# Patient Record
Sex: MALE | Race: WHITE | NOT HISPANIC OR LATINO | Employment: FULL TIME | URBAN - METROPOLITAN AREA
[De-identification: names, ages, dates, MRNs, and addresses within clinical notes are randomized per-mention and may not be internally consistent; named-entity substitution may affect disease eponyms.]

---

## 2017-08-01 ENCOUNTER — OFFICE VISIT (OUTPATIENT)
Dept: INTERNAL MEDICINE | Age: 22
End: 2017-08-01

## 2017-08-01 VITALS
HEART RATE: 72 BPM | WEIGHT: 145 LBS | DIASTOLIC BLOOD PRESSURE: 66 MMHG | HEIGHT: 68 IN | BODY MASS INDEX: 21.98 KG/M2 | SYSTOLIC BLOOD PRESSURE: 114 MMHG | TEMPERATURE: 98 F | OXYGEN SATURATION: 99 %

## 2017-08-01 DIAGNOSIS — Z23 NEED FOR DIPHTHERIA-TETANUS-PERTUSSIS (TDAP) VACCINE: ICD-10-CM

## 2017-08-01 DIAGNOSIS — R07.89 ATYPICAL CHEST PAIN: ICD-10-CM

## 2017-08-01 DIAGNOSIS — G44.209 TENSION HEADACHE: Primary | ICD-10-CM

## 2017-08-01 PROCEDURE — 99203 OFFICE O/P NEW LOW 30 MIN: CPT | Performed by: INTERNAL MEDICINE

## 2017-08-01 PROCEDURE — 90715 TDAP VACCINE 7 YRS/> IM: CPT | Performed by: INTERNAL MEDICINE

## 2017-08-01 PROCEDURE — 90471 IMMUNIZATION ADMIN: CPT | Performed by: INTERNAL MEDICINE

## 2017-08-01 NOTE — PROGRESS NOTES
"Donato Sandhu / 22 y.o. / male  08/01/2017    VITALS:    /66  Pulse 72  Temp 98 °F (36.7 °C)  Ht 68\" (172.7 cm)  Wt 145 lb (65.8 kg)  SpO2 99%  BMI 22.05 kg/m2  BP Readings from Last 3 Encounters:   08/01/17 114/66     Wt Readings from Last 3 Encounters:   08/01/17 145 lb (65.8 kg)      Body mass index is 22.05 kg/(m^2).    CC: Main reason(s) for today's visit: Establish Care      HPI:    Patient is a 22 y.o. male who is here to establish care.     2 weeks ago was experiencing regular headaches located on the occipital region.  It felt like a throbbing ache without associated photophobia, phonophobia, nausea or change in vision.  Denies previous history of headaches.  He attributes headaches to tension and stress from his previous job at [employer].  He now works at a golf shop and states that his headaches have gone away.  He also experienced some intermittent sharp left anterior chest pain.  He denies exertional chest pain or shortness of breath.  Denies palpitations.  Denies childhood history of any heart murmur.  He did have childhood history of asthma Osgood-Schlatter's dz. Denies tobacco, regular etoh or drug use.       Patient Care Team:  Fito Escobar MD as PCP - General (Internal Medicine)  ____________________________________________________________________    ASSESSMENT & PLAN:    1. Tension headache    2. Atypical chest pain    3. Need for diphtheria-tetanus-pertussis (Tdap) vaccine      Orders Placed This Encounter   Procedures   • Tdap Vaccine Greater Than or Equal To 6yo IM        Summary/Discussion:     · Tension HA's have resolved w/ decreased stress.   · Call/RTO if worsening/recurrent sx's.   · Reassurance w/ intermittent chest pain that has resolved. Call/RTO if needed.      No Follow-up on file.    No future appointments.    ____________________________________________________________________    REVIEW OF SYSTEMS    Review of Systems   Constitutional: Negative.    HENT: Negative.    Eyes: " Negative.    Respiratory: Negative.    Cardiovascular: Negative.    Gastrointestinal: Negative.    Endocrine: Negative.    Genitourinary: Negative.    Musculoskeletal: Negative.    Skin: Negative.    Allergic/Immunologic: Negative.    Neurological: Negative.    Hematological: Negative.    Psychiatric/Behavioral: Negative.      Other: As noted per HPI      PHYSICAL EXAMINATION    Physical Exam   Constitutional: He appears well-nourished. No distress.   HENT:   Head: Normocephalic.   Right Ear: Tympanic membrane normal.   Left Ear: Tympanic membrane normal.   Mouth/Throat: Oropharynx is clear and moist. No oral lesions. Tonsils are 3+ on the right. Tonsils are 3+ on the left. No tonsillar exudate.   Eyes: Conjunctivae and EOM are normal. Pupils are equal, round, and reactive to light. No scleral icterus.   Neck: Neck supple. No tracheal deviation present. No thyromegaly present.   Cardiovascular: Normal rate, regular rhythm, normal heart sounds and intact distal pulses.  Exam reveals no gallop and no friction rub.    No murmur heard.  Pulmonary/Chest: Effort normal and breath sounds normal.   Abdominal: Soft. Bowel sounds are normal. He exhibits no distension and no mass. There is no tenderness. No hernia.   Musculoskeletal: He exhibits no edema or deformity.   Lymphadenopathy:     He has no cervical adenopathy.        Right: No supraclavicular adenopathy present.        Left: No supraclavicular adenopathy present.   Neurological: He is alert. He has normal reflexes.   Skin: Skin is intact. No rash noted. No cyanosis or erythema. No pallor. Nails show no clubbing.   Psychiatric: He has a normal mood and affect. His behavior is normal. Judgment and thought content normal. Cognition and memory are normal.       REVIEWED DATA:    Labs:   No results found for: NA, K, AST, ALT, BUN, CREATININE, EGFRIFNONA, EGFRIFAFRI    No results found for: GLU, HGBA1C, MICROALBUR    No results found for: LDL, HDL, TRIG,  CHOLHDLRATIO    No results found for: TSH, FREET4     No results found for: WBC, HGB, PLT      Imaging:        Medical Tests:        Summary of old records / correspondence / consultant report:        Request outside records:        ______________________________________________________________________    No Known Allergies    No current outpatient prescriptions on file prior to visit.     No current facility-administered medications on file prior to visit.        PFSH:     The following portions of the patient's history were reviewed and updated as appropriate: Allergies / Current Medications / Past Medical History / Surgical History / Social History / Family History    Patient Active Problem List   Diagnosis   • Tension headache       Past Medical History:   Diagnosis Date   • Childhood asthma    • Osgood-Schlatter's disease     affected bilateral knees       Past Surgical History:   Procedure Laterality Date   • NO PAST SURGERIES         Social History     Social History   • Marital status: Single     Spouse name: N/A   • Number of children: N/A   • Years of education: N/A     Occupational History   • [employer]      Social History Main Topics   • Smoking status: Never Smoker   • Smokeless tobacco: Never Used   • Alcohol use Yes      Comment: occasional Etoh   • Drug use: No   • Sexual activity: Yes     Partners: Female     Birth control/ protection: OCP     Other Topics Concern   • None     Social History Narrative   • None       Family History   Problem Relation Age of Onset   • Anxiety disorder Mother    • Hypertension Mother    • No Known Problems Father    • Drug abuse Brother          **Sumeet Disclaimer:   Much of this encounter note is an electronic transcription/translation of spoken language to printed text. The electronic translation of spoken language may permit erroneous, or at times, nonsensical words or phrases to be inadvertently transcribed. Although I have reviewed the note for such errors,  some may still exist.

## 2018-04-18 ENCOUNTER — TELEPHONE (OUTPATIENT)
Dept: INTERNAL MEDICINE | Age: 23
End: 2018-04-18

## 2018-04-18 ENCOUNTER — APPOINTMENT (OUTPATIENT)
Dept: GENERAL RADIOLOGY | Facility: HOSPITAL | Age: 23
End: 2018-04-18

## 2018-04-18 ENCOUNTER — HOSPITAL ENCOUNTER (EMERGENCY)
Facility: HOSPITAL | Age: 23
Discharge: HOME | End: 2018-04-18
Attending: EMERGENCY MEDICINE | Admitting: EMERGENCY MEDICINE

## 2018-04-18 VITALS
SYSTOLIC BLOOD PRESSURE: 132 MMHG | OXYGEN SATURATION: 97 % | HEART RATE: 79 BPM | WEIGHT: 145 LBS | BODY MASS INDEX: 21.98 KG/M2 | DIASTOLIC BLOOD PRESSURE: 87 MMHG | RESPIRATION RATE: 16 BRPM | TEMPERATURE: 99 F | HEIGHT: 68 IN

## 2018-04-18 DIAGNOSIS — R07.89 ATYPICAL CHEST PAIN: Primary | ICD-10-CM

## 2018-04-18 PROCEDURE — 71046 X-RAY EXAM CHEST 2 VIEWS: CPT

## 2018-04-18 PROCEDURE — 99283 EMERGENCY DEPT VISIT LOW MDM: CPT

## 2018-04-18 PROCEDURE — 93010 ELECTROCARDIOGRAM REPORT: CPT | Performed by: INTERNAL MEDICINE

## 2018-04-18 PROCEDURE — 93005 ELECTROCARDIOGRAM TRACING: CPT | Performed by: EMERGENCY MEDICINE

## 2018-04-18 RX ORDER — ALUMINA, MAGNESIA, AND SIMETHICONE 2400; 2400; 240 MG/30ML; MG/30ML; MG/30ML
15 SUSPENSION ORAL ONCE
Status: COMPLETED | OUTPATIENT
Start: 2018-04-18 | End: 2018-04-18

## 2018-04-18 RX ORDER — OMEPRAZOLE 20 MG/1
20 CAPSULE, DELAYED RELEASE ORAL DAILY
Qty: 30 CAPSULE | Refills: 0 | Status: SHIPPED | OUTPATIENT
Start: 2018-04-18 | End: 2018-05-15 | Stop reason: SDUPTHER

## 2018-04-18 RX ADMIN — LIDOCAINE HYDROCHLORIDE 15 ML: 20 SOLUTION ORAL; TOPICAL at 17:55

## 2018-04-18 RX ADMIN — ALUMINUM HYDROXIDE, MAGNESIUM HYDROXIDE, AND DIMETHICONE 15 ML: 400; 400; 40 SUSPENSION ORAL at 17:55

## 2018-04-18 NOTE — TELEPHONE ENCOUNTER
BABAK...  PRETTY after he was Pt was scheduled for a 340 pm apt with Fidelina on Friday for chest pain. He SW  [facility]orena office staff and stated to her that he was having some intermittent chest pain. After I spoke with him further he has had up to 4 episodes  and they keep getting longer including while he is driving stating having clinching chest pain. I let him know the apt  that was made for Friday at 340 was not soon enough, he needed to go to the ER to be evaluated since this could be something more severe and he agreed to go I let him know to schedule a F/U from the ER for this. RIVER

## 2018-04-18 NOTE — ED PROVIDER NOTES
[facility] EMERGENCY DEPARTMENT ENCOUNTER    CHIEF COMPLAINT  Chief Complaint: CP  History given by: patient  History limited by: nothing  [facility] Room Number: 47/47  PMD: Fito Escobar MD      HPI:  Pt is a 23 y.o. male who presents complaining of CP that began yesterday. Pt states that he woke up on yesterday morning and he had two sharp left-sided pains in his chest. He then had some dull pain later that night. Pt also had sharp pains this morning and a second episode this afternoon that shot down his chest. He states that the episodes last only a few seconds and typically resolve after a few deep breaths. The sharp pains are worse in the morning. Pt denies SOA, BLE swelling, palpitations, nausea, vomiting, and diaphoresis. Pt denies any recent travel. At presentation, Pt has only mild dull chest pain.     Onset: sudden  Duration: 5 seconds  Severity: moderate  Associated symptoms: none  Previous treatment: none    PAST MEDICAL HISTORY  Active Ambulatory Problems     Diagnosis Date Noted   • Tension headache 08/01/2017     Resolved Ambulatory Problems     Diagnosis Date Noted   • No Resolved Ambulatory Problems     Past Medical History:   Diagnosis Date   • Childhood asthma    • Osgood-Schlatter's disease        PAST SURGICAL HISTORY  Past Surgical History:   Procedure Laterality Date   • NO PAST SURGERIES         FAMILY HISTORY  Family History   Problem Relation Age of Onset   • Anxiety disorder Mother    • Hypertension Mother    • No Known Problems Father    • Drug abuse Brother        SOCIAL HISTORY  Social History     Social History   • Marital status: Single     Spouse name: N/A   • Number of children: N/A   • Years of education: N/A     Occupational History   • [employer]      Social History Main Topics   • Smoking status: Never Smoker   • Smokeless tobacco: Never Used   • Alcohol use Yes      Comment: occasional Etoh   • Drug use: No   • Sexual activity: Yes     Partners: Female     Birth control/ protection: OCP      Other Topics Concern   • Not on file     Social History Narrative   • No narrative on file       ALLERGIES  Review of patient's allergies indicates no known allergies.    REVIEW OF SYSTEMS  Review of Systems   Constitutional: Negative for activity change, appetite change, diaphoresis and fever.   HENT: Negative for congestion and sore throat.    Eyes: Negative.    Respiratory: Negative for cough and shortness of breath.    Cardiovascular: Positive for chest pain. Negative for palpitations and leg swelling.   Gastrointestinal: Negative for abdominal pain, diarrhea, nausea and vomiting.   Endocrine: Negative.    Genitourinary: Negative for decreased urine volume and dysuria.   Musculoskeletal: Negative for neck pain.   Skin: Negative for rash and wound.   Allergic/Immunologic: Negative.    Neurological: Negative for weakness, numbness and headaches.   Hematological: Negative.    Psychiatric/Behavioral: Negative.    All other systems reviewed and are negative.      PHYSICAL EXAM  ED Triage Vitals   Temp Heart Rate Resp BP SpO2   04/18/18 1600 04/18/18 1600 04/18/18 1600 04/18/18 1611 04/18/18 1600   99.2 °F (37.3 °C) 102 16 148/95 97 %      Temp src Heart Rate Source Patient Position BP Location FiO2 (%)   -- -- -- -- --              Physical Exam   Constitutional: He is oriented to person, place, and time and well-developed, well-nourished, and in no distress.   HENT:   Head: Normocephalic and atraumatic.   Mouth/Throat: Posterior oropharyngeal erythema (mild) present.   Eyes: EOM are normal. Pupils are equal, round, and reactive to light.   Neck: Normal range of motion. Neck supple.   Cardiovascular: Normal rate, regular rhythm and normal heart sounds.    Pulmonary/Chest: Effort normal and breath sounds normal. No respiratory distress. He exhibits no tenderness.   Abdominal: Soft. Bowel sounds are normal. There is no tenderness. There is no rebound and no guarding.   Musculoskeletal: Normal range of motion. He  exhibits no edema.   Lymphadenopathy:     He has no cervical adenopathy.   Neurological: He is alert and oriented to person, place, and time. He has normal sensation and normal strength.   Skin: Skin is warm and dry.   Psychiatric: Mood and affect normal.   Nursing note and vitals reviewed.      RADIOLOGY  XR Chest 2 View   Preliminary Result   No acute process.               I ordered the above noted radiological studies. Interpreted by radiologist. Reviewed by me in PACS.       PROCEDURES  Procedures  EKG           EKG time: 17:57  Rhythm/Rate: NSR 83  P waves and NH: Normal  QRS, axis: Normal   ST and T waves: Normal     Interpreted Contemporaneously by me, independently viewed  No Previous      PROGRESS AND CONSULTS  ED Course     1644  Ordered CXR for further evaluation.    1740  Ordered EKG for further evaluation. Ordered maalox-max to treat GI as possible source of pain.    1803  Rechecked Pt who is resting comfortably. Informed him that his EKG and CXR are unremarkable. Pt states that he is feeling better after receiving the GI cocktail. He agrees that it may have been referred pain from gastric air. Discussed plans for discharge and Pt understands and agrees to all plans.       MEDICAL DECISION MAKING  Results were reviewed/discussed with the patient and they were also made aware of online access. Pt also made aware that some labs, such as cultures, will not be resulted during ER visit and follow up with PMD is necessary.     MDM  Number of Diagnoses or Management Options     Amount and/or Complexity of Data Reviewed  Tests in the radiology section of CPT®: ordered and reviewed (CXR shows nothing acute.)  Tests in the medicine section of CPT®: ordered and reviewed (See EKG note.)           DIAGNOSIS  Final diagnoses:   Atypical chest pain       DISPOSITION  DISCHARGE    Patient discharged in stable condition.    Reviewed implications of results, diagnosis, meds, responsibility to follow up, warning signs  and symptoms of possible worsening, potential complications and reasons to return to ER, including new or worsening symptoms.    Patient/Family voiced understanding of above instructions.    Discussed plan for discharge, as there is no emergent indication for admission. Patient referred to primary care provider for BP management due to today's BP. Pt/family is agreeable and understands need for follow up and repeat testing.  Pt is aware that discharge does not mean that nothing is wrong but it indicates no emergency is present that requires admission and they must continue care with follow-up as given below or physician of their choice.     FOLLOW-UP  No follow-up provider specified.       Medication List      New Prescriptions    omeprazole 20 MG capsule  Commonly known as:  PRILOSEC  Take 1 capsule by mouth Daily.              Latest Documented Vital Signs:  As of 6:16 PM  BP- 148/95 HR- 95 Temp- 99 °F (37.2 °C) O2 sat- 97%    --  Documentation assistance provided by berry Armendariz for Dr. Johnson.  Information recorded by the scribe was done at my direction and has been verified and validated by me.     Yana Armendariz  04/18/18 8221       Carlos Johnson MD  04/18/18 4508

## 2018-04-18 NOTE — DISCHARGE INSTRUCTIONS
Use Prilosec to help with your symptoms.  Please return to the ED if symptoms worsen with pain lasting more than 5 minutes with sweating or trouble breathing.

## 2018-04-25 ENCOUNTER — TELEPHONE (OUTPATIENT)
Dept: INTERNAL MEDICINE | Age: 23
End: 2018-04-25

## 2018-04-25 NOTE — TELEPHONE ENCOUNTER
He will either have to work something out or schedule with [place] [id number],and 4 are same day only per dr lovett

## 2018-04-25 NOTE — TELEPHONE ENCOUNTER
Pt called to schedule an ER f/u on chest pains. Pt stated he doesn't get off work until 3p and would need a 3:40p slot.  Pt's # [phone]  Thanks SP

## 2018-04-27 ENCOUNTER — OFFICE VISIT (OUTPATIENT)
Dept: INTERNAL MEDICINE | Age: 23
End: 2018-04-27

## 2018-04-27 ENCOUNTER — TELEPHONE (OUTPATIENT)
Dept: SOCIAL WORK | Facility: HOSPITAL | Age: 23
End: 2018-04-27

## 2018-04-27 VITALS
BODY MASS INDEX: 22.58 KG/M2 | WEIGHT: 149 LBS | DIASTOLIC BLOOD PRESSURE: 70 MMHG | HEART RATE: 72 BPM | HEIGHT: 68 IN | TEMPERATURE: 98.1 F | OXYGEN SATURATION: 98 % | SYSTOLIC BLOOD PRESSURE: 124 MMHG

## 2018-04-27 DIAGNOSIS — R07.89 ATYPICAL CHEST PAIN: Primary | ICD-10-CM

## 2018-04-27 PROCEDURE — 99213 OFFICE O/P EST LOW 20 MIN: CPT | Performed by: NURSE PRACTITIONER

## 2018-04-27 RX ORDER — IBUPROFEN 800 MG/1
TABLET ORAL
Refills: 0 | COMMUNITY
Start: 2018-03-28 | End: 2018-04-27

## 2018-04-27 RX ORDER — PENICILLIN V POTASSIUM 500 MG/1
TABLET ORAL
Refills: 1 | COMMUNITY
Start: 2018-03-22 | End: 2018-04-27

## 2018-04-27 RX ORDER — HYDROCODONE BITARTRATE AND ACETAMINOPHEN 5; 325 MG/1; MG/1
TABLET ORAL
Refills: 0 | COMMUNITY
Start: 2018-03-28 | End: 2018-04-27

## 2018-04-27 NOTE — PROGRESS NOTES
"[facility]      Donato Edson / 23 y.o. / male  04/27/2018      MEDICATIONS  Current Outpatient Prescriptions   Medication Sig Dispense Refill   • omeprazole (PRILOSEC) 20 MG capsule Take 1 capsule by mouth Daily. 30 capsule 0     No current facility-administered medications for this visit.          VITALS    Visit Vitals  /70   Pulse 72   Temp 98.1 °F (36.7 °C)   Ht 172.7 cm (67.99\")   Wt 67.6 kg (149 lb)   SpO2 98%   BMI 22.66 kg/m²       BP Readings from Last 3 Encounters:   04/27/18 124/70   04/18/18 132/87   08/01/17 114/66     Wt Readings from Last 3 Encounters:   04/27/18 67.6 kg (149 lb)   04/18/18 65.8 kg (145 lb)   08/01/17 65.8 kg (145 lb)      Body mass index is 22.66 kg/m².    CC:  Main reason(s) for today's visit: ER F/U Chest pain (4/18/2018)      HPI:     Date of emergency department encounter: 4/18/18   Emergency department: [facility]  Principle Dx: Atypical chest pain    Secondary Dx:   History prior to ED visit: Patient with one day duration of atypical left chest pain before presenting to the ER for evaluation on 4/18.   Evaluation/Treatment: Patient had negative EKG and CXR done in ER. Was discharged home on PPI for his symptoms.   Course: Pain has since improved, has not had any pain for 1-2 days.     Chest Pain: Patient complains of chest pain. Onset was 10 days ago, with improving course since that time. The patient describes the pain as intermittent, sharp in nature, does not radiate.  Associated symptoms are none. Aggravating factors are none.  Alleviating factors are: PPI (has helped some) . Patient's cardiac risk factors are male gender.  Patient's risk factors for DVT/PE: none. Previous cardiac testing: none. Of note, patient admits to being under a lot of stress recently related to work, school, and home life (his girlfriend is currently pregnant).     Patient Care Team:  Fito Escobar MD as PCP - General (Internal " Medicine)  ____________________________________________________________________    ASSESSMENT & PLAN:    1. Atypical chest pain  Patient with 9 day duration of intermittent atypical chest pain which has improved since starting PPI. Chest pain is of unknown etiology, but very low suspicion of any pulmonary or cardiac etiology. I reviewed EKG and CXR from ER visit. He has also been under considerable increased stress lately, which we discussed could be cause of his symptoms. He has not had lab work in some time, will check basic labs today. Discussed with patient to complete course of PO PPI, continue to monitor symptoms and follow up as needed for any new or worsening symptoms.     - CBC & Differential  - Comprehensive Metabolic Panel  - TSH Rfx On Abnormal To Free T4    Orders Placed This Encounter   Procedures   • Comprehensive Metabolic Panel   • TSH Rfx On Abnormal To Free T4   • CBC & Differential     Current outpatient and discharge medications have been reconciled for the patient.  Fidelina Wharton, APRN    No Follow-up on file.    No future appointments.  ____________________________________________________________________    REVIEW OF SYSTEMS    Review of Systems   Constitutional: Negative for chills, diaphoresis, fatigue and fever.   Respiratory: Negative for shortness of breath.    Cardiovascular: Positive for chest pain. Negative for palpitations and leg swelling.   Gastrointestinal: Negative for nausea and vomiting.   Neurological: Negative for dizziness and light-headedness.         PHYSICAL EXAMINATION    Physical Exam   Constitutional: He is oriented to person, place, and time. Vital signs are normal. He appears well-developed and well-nourished. He is cooperative. He does not appear ill. No distress.   Cardiovascular: Normal rate, regular rhythm, S1 normal, S2 normal and normal heart sounds.    No murmur heard.  Pulmonary/Chest: Effort normal and breath sounds normal. He has no decreased breath  sounds. He has no wheezes. He has no rhonchi. He has no rales.       Localized previous area of pain to left pectoral area radiating down to LUQ. Unable to elicit any pain with palpation.    Neurological: He is alert and oriented to person, place, and time.   Skin: Skin is warm, dry and intact.   Psychiatric: He has a normal mood and affect. His speech is normal and behavior is normal. Judgment and thought content normal. Cognition and memory are normal.   Nursing note and vitals reviewed.        REVIEWED DATA:    Labs:   No visits with results within 14 Day(s) from this visit.   Latest known visit with results is:   No results found for any previous visit.         Imaging:   Xr Chest 2 View    Result Date: 4/20/2018  Narrative: PA AND LATERAL CHEST 04/18/2018  HISTORY: Shortness of breath. Chest pain.  The heart size is within normal limits. Lungs appear free of acute infiltrates. Bones and soft tissues are unremarkable.      Impression: No acute process.  This report was finalized on 4/20/2018 12:43 PM by Dr. Salo Cates MD.           Medical Tests:         Summary of old records / correspondence / consultant report:   ED encounter note re: issues addressed on HPI    Request outside records:       ALLERGIES  No Known Allergies     PFSH:     The following portions of the patient's history were reviewed and updated as appropriate: Allergies / Current Medications / Past Medical History / Surgical History / Social History / Family History    PROBLEM LIST   Patient Active Problem List   Diagnosis   • Tension headache       PAST MEDICAL HISTORY  Past Medical History:   Diagnosis Date   • Childhood asthma    • Osgood-Schlatter's disease     affected bilateral knees       SURGICAL HISTORY  Past Surgical History:   Procedure Laterality Date   • NO PAST SURGERIES         SOCIAL HISTORY  Social History     Social History   • Marital status: Single     Occupational History   • [employer]      Social History Main  Topics   • Smoking status: Never Smoker   • Smokeless tobacco: Never Used   • Alcohol use Yes      Comment: occasional Etoh   • Drug use: No   • Sexual activity: Yes     Partners: Female     Birth control/ protection: OCP     Other Topics Concern   • Not on file       FAMILY HISTORY  Family History   Problem Relation Age of Onset   • Anxiety disorder Mother    • Hypertension Mother    • No Known Problems Father    • Drug abuse Brother          **Sumeet Disclaimer:   Much of this encounter note is an electronic transcription/translation of spoken language to printed text. The electronic translation of spoken language may permit erroneous, or at times, nonsensical words or phrases to be inadvertently transcribed. Although I have reviewed the note for such errors, some may still exist.

## 2018-04-27 NOTE — PATIENT INSTRUCTIONS
Nonspecific Chest Pain    Chest pain can be caused by many different conditions. There is always a chance that your pain could be related to something serious, such as a heart attack or a blood clot in your lungs. Chest pain can also be caused by conditions that are not life-threatening. If you have chest pain, it is very important to follow up with your health care provider.  What are the causes?  Causes of this condition include:  · Heartburn.  · Pneumonia or bronchitis.  · Anxiety or stress.  · Inflammation around your heart (pericarditis) or lung (pleuritis or pleurisy).  · A blood clot in your lung.  · A collapsed lung (pneumothorax). This can develop suddenly on its own (spontaneous pneumothorax) or from trauma to the chest.  · Shingles infection (varicella-zoster virus).  · Heart attack.  · Damage to the bones, muscles, and cartilage that make up your chest wall. This can include:  ¨ Bruised bones due to injury.  ¨ Strained muscles or cartilage due to frequent or repeated coughing or overwork.  ¨ Fracture to one or more ribs.  ¨ Sore cartilage due to inflammation (costochondritis).  What increases the risk?  Risk factors for this condition may include:  · Activities that increase your risk for trauma or injury to your chest.  · Respiratory infections or conditions that cause frequent coughing.  · Medical conditions or overeating that can cause heartburn.  · Heart disease or family history of heart disease.  · Conditions or health behaviors that increase your risk of developing a blood clot.  · Having had chicken pox (varicella zoster).  What are the signs or symptoms?  Chest pain can feel like:  · Burning or tingling on the surface of your chest or deep in your chest.  · Crushing, pressure, aching, or squeezing pain.  · Dull or sharp pain that is worse when you move, cough, or take a deep breath.  · Pain that is also felt in your back, neck, shoulder, or arm, or pain that spreads to any of these areas.  Your  chest pain may come and go, or it may stay constant.  How is this diagnosed?  Lab tests or other studies may be needed to find the cause of your pain. Your health care provider may have you take a test called an ECG (electrocardiogram). An ECG records your heartbeat patterns at the time the test is performed. You may also have other tests, such as:  · Transthoracic echocardiogram (TTE). In this test, sound waves are used to create a picture of the heart structures and to look at how blood flows through your heart.  · Transesophageal echocardiogram (AMAIRANI). This is a more advanced imaging test that takes images from inside your body. It allows your health care provider to see your heart in finer detail.  · Cardiac monitoring. This allows your health care provider to monitor your heart rate and rhythm in real time.  · Holter monitor. This is a portable device that records your heartbeat and can help to diagnose abnormal heartbeats. It allows your health care provider to track your heart activity for several days, if needed.  · Stress tests. These can be done through exercise or by taking medicine that makes your heart beat more quickly.  · Blood tests.  · Other imaging tests.  How is this treated?  Treatment depends on what is causing your chest pain. Treatment may include:  · Medicines. These may include:  ¨ Acid blockers for heartburn.  ¨ Anti-inflammatory medicine.  ¨ Pain medicine for inflammatory conditions.  ¨ Antibiotic medicine, if an infection is present.  ¨ Medicines to dissolve blood clots.  ¨ Medicines to treat coronary artery disease (CAD).  · Supportive care for conditions that do not require medicines. This may include:  ¨ Resting.  ¨ Applying heat or cold packs to injured areas.  ¨ Limiting activities until pain decreases.  Follow these instructions at home:  Medicines   · If you were prescribed an antibiotic, take it as told by your health care provider. Do not stop taking the antibiotic even if you  start to feel better.  · Take over-the-counter and prescription medicines only as told by your health care provider.  Lifestyle   · Do not use any products that contain nicotine or tobacco, such as cigarettes and e-cigarettes. If you need help quitting, ask your health care provider.  · Do not drink alcohol.  · Make lifestyle changes as directed by your health care provider. These may include:  ¨ Getting regular exercise. Ask your health care provider to suggest some activities that are safe for you.  ¨ Eating a heart-healthy diet. A registered dietitian can help you to learn healthy eating options.  ¨ Maintaining a healthy weight.  ¨ Managing diabetes, if necessary.  ¨ Reducing stress, such as with yoga or relaxation techniques.  General instructions   · Avoid any activities that bring on chest pain.  · If heartburn is the cause for your chest pain, raise (elevate) the head of your bed about 6 inches (15 cm) by putting blocks under the legs. Sleeping with more pillows does not effectively relieve heartburn because it only changes the position of your head.  · Keep all follow-up visits as told by your health care provider. This is important. This includes any further testing if your chest pain does not go away.  Contact a health care provider if:  · Your chest pain does not go away.  · You have a rash with blisters on your chest.  · You have a fever.  · You have chills.  Get help right away if:  · Your chest pain is worse.  · You have a cough that gets worse, or you cough up blood.  · You have severe pain in your abdomen.  · You have severe weakness.  · You faint.  · You have sudden, unexplained chest discomfort.  · You have sudden, unexplained discomfort in your arms, back, neck, or jaw.  · You have shortness of breath at any time.  · You suddenly start to sweat, or your skin gets clammy.  · You feel nauseous or you vomit.  · You suddenly feel light-headed or dizzy.  · Your heart begins to beat quickly, or it feels  like it is skipping beats.  These symptoms may represent a serious problem that is an emergency. Do not wait to see if the symptoms will go away. Get medical help right away. Call your local emergency services (911 in the [place] Do not drive yourself to the hospital.  This information is not intended to replace advice given to you by your health care provider. Make sure you discuss any questions you have with your health care provider.  Document Released: 09/27/2006 Document Revised: 09/11/2017 Document Reviewed: 09/11/2017  Elsevier Interactive Patient Education © 2017 [employer]

## 2018-04-28 LAB
ALBUMIN SERPL-MCNC: 5.2 G/DL (ref 3.5–5.2)
ALBUMIN/GLOB SERPL: 2.5 G/DL
ALP SERPL-CCNC: 70 U/L (ref 39–117)
ALT SERPL-CCNC: 22 U/L (ref 1–41)
AST SERPL-CCNC: 20 U/L (ref 1–40)
BASOPHILS # BLD AUTO: 0.08 10*3/MM3 (ref 0–0.2)
BASOPHILS NFR BLD AUTO: 1 % (ref 0–1.5)
BILIRUB SERPL-MCNC: 0.4 MG/DL (ref 0.1–1.2)
BUN SERPL-MCNC: 13 MG/DL (ref 6–20)
BUN/CREAT SERPL: 14.1 (ref 7–25)
CALCIUM SERPL-MCNC: 9.8 MG/DL (ref 8.6–10.5)
CHLORIDE SERPL-SCNC: 102 MMOL/L (ref 98–107)
CO2 SERPL-SCNC: 28.6 MMOL/L (ref 22–29)
CREAT SERPL-MCNC: 0.92 MG/DL (ref 0.76–1.27)
EOSINOPHIL # BLD AUTO: 0.45 10*3/MM3 (ref 0–0.7)
EOSINOPHIL NFR BLD AUTO: 5.8 % (ref 0.3–6.2)
ERYTHROCYTE [DISTWIDTH] IN BLOOD BY AUTOMATED COUNT: 12.8 % (ref 11.5–14.5)
GFR SERPLBLD CREATININE-BSD FMLA CKD-EPI: 102 ML/MIN/1.73
GFR SERPLBLD CREATININE-BSD FMLA CKD-EPI: 124 ML/MIN/1.73
GLOBULIN SER CALC-MCNC: 2.1 GM/DL
GLUCOSE SERPL-MCNC: 95 MG/DL (ref 65–99)
HCT VFR BLD AUTO: 44.6 % (ref 40.4–52.2)
HGB BLD-MCNC: 15.3 G/DL (ref 13.7–17.6)
IMM GRANULOCYTES # BLD: 0.01 10*3/MM3 (ref 0–0.03)
IMM GRANULOCYTES NFR BLD: 0.1 % (ref 0–0.5)
LYMPHOCYTES # BLD AUTO: 2.22 10*3/MM3 (ref 0.9–4.8)
LYMPHOCYTES NFR BLD AUTO: 28.8 % (ref 19.6–45.3)
MCH RBC QN AUTO: 31.2 PG (ref 27–32.7)
MCHC RBC AUTO-ENTMCNC: 34.3 G/DL (ref 32.6–36.4)
MCV RBC AUTO: 90.8 FL (ref 79.8–96.2)
MONOCYTES # BLD AUTO: 0.57 10*3/MM3 (ref 0.2–1.2)
MONOCYTES NFR BLD AUTO: 7.4 % (ref 5–12)
NEUTROPHILS # BLD AUTO: 4.38 10*3/MM3 (ref 1.9–8.1)
NEUTROPHILS NFR BLD AUTO: 57 % (ref 42.7–76)
PLATELET # BLD AUTO: 251 10*3/MM3 (ref 140–500)
POTASSIUM SERPL-SCNC: 4.1 MMOL/L (ref 3.5–5.2)
PROT SERPL-MCNC: 7.3 G/DL (ref 6–8.5)
RBC # BLD AUTO: 4.91 10*6/MM3 (ref 4.6–6)
SODIUM SERPL-SCNC: 142 MMOL/L (ref 136–145)
TSH SERPL DL<=0.005 MIU/L-ACNC: 0.96 MIU/ML (ref 0.27–4.2)
WBC # BLD AUTO: 7.7 10*3/MM3 (ref 4.5–10.7)

## 2018-05-15 RX ORDER — OMEPRAZOLE 20 MG/1
20 CAPSULE, DELAYED RELEASE ORAL DAILY
Qty: 30 CAPSULE | Refills: 3 | Status: SHIPPED | OUTPATIENT
Start: 2018-05-15 | End: 2019-08-05 | Stop reason: SDUPTHER

## 2019-08-05 ENCOUNTER — OFFICE VISIT (OUTPATIENT)
Dept: INTERNAL MEDICINE | Age: 24
End: 2019-08-05

## 2019-08-05 VITALS
SYSTOLIC BLOOD PRESSURE: 110 MMHG | DIASTOLIC BLOOD PRESSURE: 64 MMHG | BODY MASS INDEX: 23.7 KG/M2 | HEIGHT: 67 IN | OXYGEN SATURATION: 98 % | TEMPERATURE: 98.5 F | HEART RATE: 65 BPM | WEIGHT: 151 LBS

## 2019-08-05 DIAGNOSIS — K21.9 GASTROESOPHAGEAL REFLUX DISEASE, ESOPHAGITIS PRESENCE NOT SPECIFIED: Primary | ICD-10-CM

## 2019-08-05 DIAGNOSIS — J30.9 ALLERGIC RHINITIS, UNSPECIFIED SEASONALITY, UNSPECIFIED TRIGGER: ICD-10-CM

## 2019-08-05 DIAGNOSIS — F41.9 ANXIETY: ICD-10-CM

## 2019-08-05 PROCEDURE — 99214 OFFICE O/P EST MOD 30 MIN: CPT | Performed by: INTERNAL MEDICINE

## 2019-08-05 RX ORDER — OMEPRAZOLE 20 MG/1
20 CAPSULE, DELAYED RELEASE ORAL DAILY
Qty: 30 CAPSULE | Refills: 3 | Status: SHIPPED | OUTPATIENT
Start: 2019-08-05 | End: 2023-02-21

## 2019-08-05 RX ORDER — FEXOFENADINE HCL 180 MG/1
180 TABLET ORAL DAILY
COMMUNITY
Start: 2019-08-05 | End: 2023-01-20

## 2019-08-05 RX ORDER — FLUTICASONE PROPIONATE 50 MCG
SPRAY, SUSPENSION (ML) NASAL
COMMUNITY
Start: 2019-08-05 | End: 2023-01-20

## 2019-08-05 NOTE — PATIENT INSTRUCTIONS
** IMPORTANT MESSAGE FROM DR. SOLORZANO **    In our office, your satisfaction is VERY important to us.     You may receive a survey from Vicki Ramírez by mail or E-mail for you to provide feedback about your visit. This information is invaluable for me to know what we can do to improve our services.     I ask that you please take a few minutes to complete the survey and let us know how we are doing in serving your needs. (You may receive the survey more than once for multiple visits)    Thank You !    Dr. Solorzano & Staff    __________________________________________________________      ADDITIONAL INSTRUCTION / REMINDERS FROM DR. SOLORZANO

## 2019-08-05 NOTE — PROGRESS NOTES
[facility] [place]  ERINN SOLORZANO M.D.      Donato Edson / 24 y.o. / male  08/05/2019      CC:  Main reason(s) for today's visit: Heartburn; Heart racing sensation; and Anxiety      HPI:     Complains of recurrent heartburn symptoms associated with eating red sauce. Previously did well with omeprazole but stopped taking it regularly. Denies dysphagia.     Last week felt his heart racing, felt anxious and shaky. He was taking Sudafed for 3 days and also drinks large volume of caffeine soda. Denies chest pain, CANO, presyncope. He does have some anxiety issues. No drugs or excessive alcohol reported.       Patient Care Team:  Fito Solorzano MD as PCP - General (Internal Medicine)    ASSESSMENT & PLAN:    Problem List Items Addressed This Visit        High    Gastroesophageal reflux disease - Primary    Current Assessment & Plan     Educational handout given on GERD.   Start omeprazole 20 mg qd PRN.   Reduce caffeine.          Relevant Medications    omeprazole (PRILOSEC) 20 MG capsule       Medium    Allergic rhinitis    Current Assessment & Plan     Start fexofenadine 180 mg and Flonase daily.          Relevant Medications    fexofenadine (ALLEGRA) 180 MG tablet    fluticasone (FLONASE) 50 MCG/ACT nasal spray       Low    Anxiety    Current Assessment & Plan     Reduce caffeine and avoid decongestant use.                 Summary/Discussion:        Next Appointment with me: Visit date not found    Return for worsening problem or if not improving.    ____________________________________________________________________    MEDICATIONS  Current Outpatient Medications   Medication Sig Dispense Refill   • omeprazole (PRILOSEC) 20 MG capsule PRN  30 capsule 3     ____________________________________________________________________      REVIEW OF SYSTEMS    Review of Systems   Constitutional: Negative.    HENT: Positive for congestion.    Respiratory: Negative.    Cardiovascular: Positive for palpitations. Negative for chest  "pain.   Gastrointestinal: Negative for abdominal pain, blood in stool, nausea and vomiting.   Psychiatric/Behavioral: The patient is nervous/anxious.          VITALS    Visit Vitals  /64   Pulse 65   Temp 98.5 °F (36.9 °C)   Ht 170.2 cm (67\")   Wt 68.5 kg (151 lb)   SpO2 98%   BMI 23.65 kg/m²       BP Readings from Last 3 Encounters:   08/05/19 110/64   04/27/18 124/70   04/18/18 132/87     Wt Readings from Last 3 Encounters:   08/05/19 68.5 kg (151 lb)   04/27/18 67.6 kg (149 lb)   04/18/18 65.8 kg (145 lb)      Body mass index is 23.65 kg/m².    PHYSICAL EXAMINATION    Physical Exam   Constitutional: No distress.   Cardiovascular: Normal rate, regular rhythm and normal heart sounds.   Abdominal: Soft. There is no tenderness.   Psychiatric: He has a normal mood and affect. His behavior is normal. Judgment and thought content normal.       REVIEWED DATA:    Labs:       Imaging:        Medical Tests:       Summary of old records / correspondence / consultant report:        Request outside records:       ALLERGIES  No Known Allergies     PFSH:     The following portions of the patient's history were reviewed and updated as appropriate: Allergies / Current Medications / Past Medical History / Surgical History / Social History / Family History    PROBLEM LIST   Patient Active Problem List   Diagnosis   • Tension headache   • Gastroesophageal reflux disease   • Allergic rhinitis   • Anxiety       PAST MEDICAL HISTORY  Past Medical History:   Diagnosis Date   • Childhood asthma    • Osgood-Schlatter's disease     affected bilateral knees       SURGICAL HISTORY  Past Surgical History:   Procedure Laterality Date   • NO PAST SURGERIES         SOCIAL HISTORY  Social History     Socioeconomic History   • Marital status: Single     Spouse name: Not on file   • Number of children: 1   • Years of education: Not on file   • Highest education level: Not on file   Occupational History   • 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)   Tobacco Use   • Smoking status: Never Smoker   • Smokeless tobacco: Never Used   Substance and Sexual Activity   • Alcohol use: Yes     Frequency: 2-3 times a week     Drinks per session: 1 or 2   • Drug use: No   • Sexual activity: Yes     Partners: Female     Birth control/protection: OCP       FAMILY HISTORY  Family History   Problem Relation Age of Onset   • Anxiety disorder Mother    • Hypertension Mother    • No Known Problems Father    • Drug abuse Brother          **Dragon Disclaimer:   Much of this encounter note is an electronic transcription/translation of spoken language to printed text. The electronic translation of spoken language may permit erroneous, or at times, nonsensical words or phrases to be inadvertently transcribed. Although I have reviewed the note for such errors, some may still exist.       Template created by Shaun Escobar MD

## 2020-01-06 ENCOUNTER — HOSPITAL ENCOUNTER (OUTPATIENT)
Dept: GENERAL RADIOLOGY | Facility: HOSPITAL | Age: 25
Discharge: HOME | End: 2020-01-06
Admitting: INTERNAL MEDICINE

## 2020-01-06 ENCOUNTER — OFFICE VISIT (OUTPATIENT)
Dept: INTERNAL MEDICINE | Age: 25
End: 2020-01-06

## 2020-01-06 VITALS
SYSTOLIC BLOOD PRESSURE: 126 MMHG | DIASTOLIC BLOOD PRESSURE: 70 MMHG | WEIGHT: 158 LBS | HEART RATE: 73 BPM | OXYGEN SATURATION: 98 % | TEMPERATURE: 97.6 F | BODY MASS INDEX: 24.8 KG/M2 | HEIGHT: 67 IN

## 2020-01-06 DIAGNOSIS — M25.561 CHRONIC PAIN OF RIGHT KNEE: Primary | ICD-10-CM

## 2020-01-06 DIAGNOSIS — G89.29 CHRONIC PAIN OF RIGHT KNEE: Primary | ICD-10-CM

## 2020-01-06 PROCEDURE — 99214 OFFICE O/P EST MOD 30 MIN: CPT | Performed by: INTERNAL MEDICINE

## 2020-01-06 PROCEDURE — 73562 X-RAY EXAM OF KNEE 3: CPT

## 2020-01-06 RX ORDER — PREDNISONE 10 MG/1
TABLET ORAL
COMMUNITY
Start: 2020-01-03 | End: 2020-01-06

## 2020-01-06 RX ORDER — BACLOFEN 10 MG/1
TABLET ORAL
COMMUNITY
Start: 2020-01-03 | End: 2020-01-06

## 2020-01-06 RX ORDER — MELOXICAM 15 MG/1
15 TABLET ORAL DAILY
Qty: 30 TABLET | Refills: 0 | Status: SHIPPED | OUTPATIENT
Start: 2020-01-06 | End: 2023-01-20

## 2020-01-06 NOTE — PROGRESS NOTES
"[facility] [place]  ERINN SOLORZANO M.D.      Donato Edson / 24 y.o. / male  01/06/2020      CHIEF COMPLAINT     Knee Pain (right x 7-8 months )         ASSESSMENT & PLAN     1. Chronic pain of right knee      Orders Placed This Encounter   Procedures   • XR Knee 3 View Right     New Medications Ordered This Visit   Medications   • meloxicam (MOBIC) 15 MG tablet     Sig: Take 1 tablet by mouth Daily.     Dispense:  30 tablet     Refill:  0       Summary/Discussion:  · Xray of the right knee   · Meloxicam 15 mg qd PRN  · Educational handout given on knee conditioning  · Discussed ergonomics, using knee pads  · If not improving, see PT    Next Appointment with me: Visit date not found    Return for worsening or lack of improvement.      VITAL SIGNS     Visit Vitals  /70   Pulse 73   Temp 97.6 °F (36.4 °C)   Ht 170.2 cm (67\")   Wt 71.7 kg (158 lb)   SpO2 98%   BMI 24.75 kg/m²       BP Readings from Last 3 Encounters:   01/06/20 126/70   08/05/19 110/64   04/27/18 124/70     Wt Readings from Last 3 Encounters:   01/06/20 71.7 kg (158 lb)   08/05/19 68.5 kg (151 lb)   04/27/18 67.6 kg (149 lb)      Body mass index is 24.75 kg/m².        HISTORY OF PRESENT ILLNESS      This is a new problem to this examiner.  Chronic right knee pain, seen by orthopedist > 2 yrs ago and no specific treatment plans given.   Denies trauma or injury, swelling or warm/hot joint  Is in apprenticeship for   On his feet and this makes pain worse as well as being down on his knee   Denies other arthritic sites  History of childhood Osgood-Schlatter's disease  [place] [facility] [place] and was [place] prednisone and cyclobenzaprine (? Why)     Patient Care Team:  Fito Solorzano MD as PCP - General (Internal Medicine)      REVIEW OF SYSTEMS       Review of Systems  Constitutional neg  Resp neg  CV neg  GI neg  MuSk no joint swelling, back pain       PHYSICAL EXAMINATION     Physical Exam   Musculoskeletal:        Right knee: Normal. He exhibits " normal range of motion, no swelling, no effusion, no deformity, no LCL laxity, normal patellar mobility and no bony tenderness. No tenderness found.        Left knee: Normal.     Constitutional  No distress  Psychiatric  Alert. Judgment intact. Thought content normal. Mood normal    REVIEWED DATA       Labs:     Lab Results   Component Value Date     04/27/2018    K 4.1 04/27/2018    CALCIUM 9.8 04/27/2018    AST 20 04/27/2018    ALT 22 04/27/2018    BUN 13 04/27/2018    CREATININE 0.92 04/27/2018    EGFRIFNONA 102 04/27/2018    EGFRIFAFRI 124 04/27/2018       Lab Results   Component Value Date    GLU 95 04/27/2018       No results found for: LDL, HDL, TRIG, CHOLHDLRATIO    Lab Results   Component Value Date    TSH 0.963 04/27/2018       Lab Results   Component Value Date    WBC 7.70 04/27/2018    HGB 15.3 04/27/2018     04/27/2018       No results found for: PROTEIN, GLUCOSEU, BLOODU, NITRITEU, LEUKOCYTESUR    Imaging:         Medical Tests:         Summary of old records / correspondence / consultant report:         Request outside records:         ALLERGIES  No Known Allergies     MEDICATIONS  Current Outpatient Medications   Medication Sig Dispense Refill   • omeprazole (PRILOSEC) 20 MG capsule Take 1 capsule by mouth Daily. 30 capsule 3   • fexofenadine (ALLEGRA) 180 MG tablet Take 1 tablet by mouth Daily.     • fluticasone (FLONASE) 50 MCG/ACT nasal spray Administer 1spray in each nostril twice daily.     • meloxicam (MOBIC) 15 MG tablet Take 1 tablet by mouth Daily. 30 tablet 0     No current facility-administered medications for this visit.        PFSH:     The following portions of the patient's history were reviewed and updated as appropriate: Allergies / Current Medications / Past Medical History / Surgical History / Social History / Family History    PROBLEM LIST   Patient Active Problem List   Diagnosis   • Tension headache   • Gastroesophageal reflux disease   • Allergic rhinitis   •  Anxiety       PAST MEDICAL HISTORY  Past Medical History:   Diagnosis Date   • Childhood asthma    • Osgood-Schlatter's disease     affected bilateral knees       SURGICAL HISTORY  Past Surgical History:   Procedure Laterality Date   • NO PAST SURGERIES         SOCIAL HISTORY  Social History     Socioeconomic History   • Marital status: Single     Spouse name: Not on file   • Number of children: 1   • Years of education: Not on file   • Highest education level: Not on file   Occupational History   • Occupation: [employer] ()   Tobacco Use   • Smoking status: Never Smoker   • Smokeless tobacco: Never Used   Substance and Sexual Activity   • Alcohol use: Yes     Frequency: 2-3 times a week     Drinks per session: 1 or 2   • Drug use: No   • Sexual activity: Yes     Partners: Female     Birth control/protection: OCP       FAMILY HISTORY  Family History   Problem Relation Age of Onset   • Anxiety disorder Mother    • Hypertension Mother    • No Known Problems Father    • Drug abuse Brother          **Dragon Disclaimer:   Much of this encounter note is an electronic transcription/translation of spoken language to printed text. The electronic translation of spoken language may permit erroneous, or at times, nonsensical words or phrases to be inadvertently transcribed. Although I have reviewed the note for such errors, some may still exist.       Template created by Shaun Escobar MD

## 2021-04-16 ENCOUNTER — BULK ORDERING (OUTPATIENT)
Dept: CASE MANAGEMENT | Facility: OTHER | Age: 26
End: 2021-04-16

## 2021-04-16 DIAGNOSIS — Z23 IMMUNIZATION DUE: ICD-10-CM

## 2023-01-20 ENCOUNTER — OFFICE VISIT (OUTPATIENT)
Dept: FAMILY MEDICINE CLINIC | Facility: CLINIC | Age: 28
End: 2023-01-20
Payer: COMMERCIAL

## 2023-01-20 VITALS
BODY MASS INDEX: 27.15 KG/M2 | RESPIRATION RATE: 18 BRPM | HEART RATE: 77 BPM | HEIGHT: 67 IN | DIASTOLIC BLOOD PRESSURE: 72 MMHG | TEMPERATURE: 97.1 F | SYSTOLIC BLOOD PRESSURE: 112 MMHG | WEIGHT: 173 LBS | OXYGEN SATURATION: 98 %

## 2023-01-20 DIAGNOSIS — R20.0 NUMBNESS AND TINGLING IN LEFT ARM: ICD-10-CM

## 2023-01-20 DIAGNOSIS — R20.2 NUMBNESS AND TINGLING IN LEFT ARM: ICD-10-CM

## 2023-01-20 DIAGNOSIS — F32.A ANXIETY AND DEPRESSION: Primary | ICD-10-CM

## 2023-01-20 DIAGNOSIS — Z00.00 HEALTHCARE MAINTENANCE: ICD-10-CM

## 2023-01-20 DIAGNOSIS — F41.9 ANXIETY AND DEPRESSION: Primary | ICD-10-CM

## 2023-01-20 PROCEDURE — 99214 OFFICE O/P EST MOD 30 MIN: CPT

## 2023-01-20 PROCEDURE — 99385 PREV VISIT NEW AGE 18-39: CPT

## 2023-01-20 RX ORDER — FLUOXETINE 10 MG/1
10 CAPSULE ORAL DAILY
Qty: 30 CAPSULE | Refills: 0 | Status: SHIPPED | OUTPATIENT
Start: 2023-01-20 | End: 2023-02-21

## 2023-01-20 RX ORDER — HYDROXYZINE HYDROCHLORIDE 25 MG/1
25 TABLET, FILM COATED ORAL NIGHTLY PRN
Qty: 30 TABLET | Refills: 0 | Status: SHIPPED | OUTPATIENT
Start: 2023-01-20 | End: 2023-02-21
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
## 2023-01-20 NOTE — LETTER
January 20, 2023     Patient: Donato Sandhu   YOB: 1995   Date of Visit: 1/20/2023       To Whom It May Concern:    Donato was seen in our office today. It is my medical opinion that Donato Sandhu may return to work on 1/21/22 with no restrictions. .           Sincerely,        JEAN-PAUL Paz    CC: No Recipients

## 2023-02-21 ENCOUNTER — OFFICE VISIT (OUTPATIENT)
Dept: FAMILY MEDICINE CLINIC | Facility: CLINIC | Age: 28
End: 2023-02-21
Payer: COMMERCIAL

## 2023-02-21 VITALS
HEIGHT: 67 IN | WEIGHT: 174 LBS | BODY MASS INDEX: 27.31 KG/M2 | HEART RATE: 80 BPM | SYSTOLIC BLOOD PRESSURE: 120 MMHG | DIASTOLIC BLOOD PRESSURE: 60 MMHG | RESPIRATION RATE: 18 BRPM | OXYGEN SATURATION: 99 %

## 2023-02-21 DIAGNOSIS — F32.A ANXIETY AND DEPRESSION: Primary | ICD-10-CM

## 2023-02-21 DIAGNOSIS — F41.9 ANXIETY AND DEPRESSION: Primary | ICD-10-CM

## 2023-02-21 DIAGNOSIS — K21.9 GASTROESOPHAGEAL REFLUX DISEASE, UNSPECIFIED WHETHER ESOPHAGITIS PRESENT: ICD-10-CM

## 2023-02-21 PROCEDURE — 99213 OFFICE O/P EST LOW 20 MIN: CPT

## 2023-02-21 RX ORDER — OMEPRAZOLE 40 MG/1
40 CAPSULE, DELAYED RELEASE ORAL DAILY
Qty: 90 CAPSULE | Refills: 1 | Status: SHIPPED | OUTPATIENT
Start: 2023-02-21

## 2023-02-21 NOTE — PROGRESS NOTES
"Subjective   Donato Sandhu is a 27 y.o. male. Who presents to f. on anxiety and depression and gerd      History of Present Illness     Anxiety/depression- symptoms unchanged but hesitant to take medications so has not started prescribed regimen. Discussed with wife and would like to trial counseling first   No new symptoms. No thoughts of self harm. Thinks most of the anxiety r.t job and thinks dealing well currently    GERD- takes OTC omeprazole 20 mg which helps most days but sometimes still wakes up with symptoms. Does forget to take some days. Mostly acid reflux. No abd pain, nausea, vomiting, stool changes, weight loss    The following portions of the patient's history were reviewed and updated as appropriate: allergies, current medications, past family history, past medical history, past social history and past surgical history.    Review of Systems   Constitutional: Negative for unexpected weight loss.   Respiratory: Negative.    Cardiovascular: Negative.    Gastrointestinal: Positive for GERD. Negative for abdominal pain, constipation, diarrhea, nausea and vomiting.   Psychiatric/Behavioral: Positive for sleep disturbance, depressed mood and stress. Negative for self-injury and suicidal ideas. The patient is nervous/anxious.        Objective    /60   Pulse 80   Resp 18   Ht 170.2 cm (67\")   Wt 78.9 kg (174 lb)   SpO2 99%   BMI 27.25 kg/m²     Physical Exam  Constitutional:       Appearance: Normal appearance. He is not ill-appearing.   Pulmonary:      Effort: Pulmonary effort is normal. No respiratory distress.   Neurological:      General: No focal deficit present.      Mental Status: He is alert and oriented to person, place, and time.      Cranial Nerves: No dysarthria.      Gait: Gait is intact.   Psychiatric:         Attention and Perception: Attention normal.         Mood and Affect: Mood normal.         Speech: Speech normal.         Behavior: Behavior normal.         Thought Content: " Thought content normal.         Cognition and Memory: Cognition normal.         Judgment: Judgment normal.         Assessment & Plan   Diagnoses and all orders for this visit:    1. Anxiety and depression (Primary)       -    Reluctant to start medication. Thinks able to deal with symptoms and wants to trial counseling. Hand out with resources provided.   May trial hydroxyzine (already picked up)  only at HS as states anxiety worse at night- start with 1/2 tablet on a night that wife is at home and see how he does.   Denies thoughts of self harm or to others- If these occur seek help immediately    2. Gastroesophageal reflux disease, unspecified whether esophagitis present  -     omeprazole (priLOSEC) 40 MG capsule; Take 1 capsule by mouth Daily.  Dispense: 90 capsule; Refill: 1  -     Increase to 40 mg daily and see if better results. Advised to take early am or evening on an empty stomach and wait 30-60 min before eating/drinkg.   Avoid triggers. Do not lie down for at least 1 hr after eating.   If no improvement follow up .       Advised pt that I will be leaving practice in about 3 weeks. If needs refills/questions/concerns call prior.   Advise that practice is actively looking to hire another provider but meanwhile may  start looking for new PCP or wait to get letter with recommendations.      - Pt agrees with plan of care and states no further concerns or questions today  This document is intended for medical expert use only. Reading of this document by patients and/or patient's family without participating medical staff guidance may result in misinterpretation and unintended morbidity.  Any interpretation of such data is the responsibility of the patient and/or family member responsible for the patient in concert with their primary or specialist providers, not to be left for sources of online searches such as [employer], [employer] or similar queries. Relying on these approaches to knowledge may result in  misinterpretation, misguided goals of care and even death should patients or family members try recommendations outside of the realm of professional medical care in a supervised way.     Please allow 3-5 business days for recommendations based on new results     Go to the ER for any possible lifethreatening symptoms such as chest pain or shortness of air.      I personally spent 20 minutes with this patient, preparing for the visit, reviewing tests, obtaining and/or reviewing a separately obtained history, performing a medically appropriate examination and/or evaluation , counseling and educating the patient/family/caregiver, ordering medications, tests, or procedures, documenting information in the medical record and independently interpreting results.